# Patient Record
Sex: FEMALE | Race: OTHER
[De-identification: names, ages, dates, MRNs, and addresses within clinical notes are randomized per-mention and may not be internally consistent; named-entity substitution may affect disease eponyms.]

---

## 2019-12-11 ENCOUNTER — HOSPITAL ENCOUNTER (EMERGENCY)
Dept: HOSPITAL 54 - ER | Age: 49
Discharge: HOME | End: 2019-12-11
Payer: COMMERCIAL

## 2019-12-11 VITALS — SYSTOLIC BLOOD PRESSURE: 130 MMHG | DIASTOLIC BLOOD PRESSURE: 79 MMHG

## 2019-12-11 VITALS — WEIGHT: 193 LBS | BODY MASS INDEX: 31.02 KG/M2 | HEIGHT: 66 IN

## 2019-12-11 DIAGNOSIS — Z90.49: ICD-10-CM

## 2019-12-11 DIAGNOSIS — N39.0: Primary | ICD-10-CM

## 2019-12-11 DIAGNOSIS — F10.10: ICD-10-CM
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
## 2019-12-11 LAB
APPEARANCE UR: (no result)
BILIRUB UR QL STRIP: NEGATIVE
COLOR UR: YELLOW
GLUCOSE UR STRIP-MCNC: NEGATIVE MG/DL
HGB UR QL STRIP: (no result) ERY/UL
KETONES UR STRIP-MCNC: NEGATIVE MG/DL
LEUKOCYTE ESTERASE UR QL STRIP: (no result)
NITRITE UR QL STRIP: POSITIVE
PH UR STRIP: 7.5 [PH] (ref 5–8)
PROT UR QL STRIP: 30 MG/DL
UROBILINOGEN UR STRIP-MCNC: 0.2 EU/DL
WBC #/AREA URNS HPF: (no result) /HPF (ref 0–3)

## 2019-12-11 NOTE — NUR
PT BIBSELF C/O DYSURIA X4 DAYS. PT STATES SHE TOOK OTC AZO MEDICATION X3 DAYS 
WITH MINIMAL RELIEF. PT AAOX4. RESPIRATIONS EVEN AND UNLABORED. SKIN WARM AND 
INTACT. AMBULATORY WITH STEADY GAIT. NO ACUTE DISTRESS NOTED AT THIS TIME. WILL 
CONTINUE TO MONITOR